# Patient Record
Sex: FEMALE | URBAN - METROPOLITAN AREA
[De-identification: names, ages, dates, MRNs, and addresses within clinical notes are randomized per-mention and may not be internally consistent; named-entity substitution may affect disease eponyms.]

---

## 2018-11-08 ENCOUNTER — APPOINTMENT (RX ONLY)
Dept: URBAN - METROPOLITAN AREA CLINIC 9 | Facility: CLINIC | Age: 73
Setting detail: DERMATOLOGY
End: 2018-11-08

## 2018-11-08 DIAGNOSIS — L30.9 DERMATITIS, UNSPECIFIED: ICD-10-CM

## 2018-11-08 PROCEDURE — ? PATIENT SPECIFIC COUNSELING

## 2018-11-08 PROCEDURE — 99202 OFFICE O/P NEW SF 15 MIN: CPT

## 2018-11-08 PROCEDURE — ? PRESCRIPTION

## 2018-11-08 PROCEDURE — ? COUNSELING

## 2018-11-08 RX ORDER — FLUOCINONIDE 0.5 MG/ML
SOLUTION TOPICAL
Qty: 1 | Refills: 0 | Status: ERX | COMMUNITY
Start: 2018-11-08

## 2018-11-08 RX ADMIN — FLUOCINONIDE: 0.5 SOLUTION TOPICAL at 00:00

## 2018-11-08 ASSESSMENT — LOCATION DETAILED DESCRIPTION DERM: LOCATION DETAILED: RIGHT SUPERIOR PARIETAL SCALP

## 2018-11-08 ASSESSMENT — LOCATION ZONE DERM: LOCATION ZONE: SCALP

## 2018-11-08 ASSESSMENT — LOCATION SIMPLE DESCRIPTION DERM: LOCATION SIMPLE: SCALP

## 2018-11-08 NOTE — PROCEDURE: COUNSELING
Patient Specific Counseling (Will Not Stick From Patient To Patient): Don’t OTC anti-histamine daily at night.
Detail Level: Zone

## 2018-11-08 NOTE — PROCEDURE: PATIENT SPECIFIC COUNSELING
Detail Level: Zone
Consider patch test in future if rash continues to recur.\\n\\nWould like to f/u with Dr. Bill for rash f/u as well as filler.

## 2019-08-28 ENCOUNTER — APPOINTMENT (RX ONLY)
Dept: URBAN - METROPOLITAN AREA CLINIC 42 | Facility: CLINIC | Age: 74
Setting detail: DERMATOLOGY
End: 2019-08-28

## 2019-08-28 DIAGNOSIS — L81.4 OTHER MELANIN HYPERPIGMENTATION: ICD-10-CM

## 2019-08-28 DIAGNOSIS — D485 NEOPLASM OF UNCERTAIN BEHAVIOR OF SKIN: ICD-10-CM

## 2019-08-28 DIAGNOSIS — L82.1 OTHER SEBORRHEIC KERATOSIS: ICD-10-CM

## 2019-08-28 DIAGNOSIS — L60.3 NAIL DYSTROPHY: ICD-10-CM

## 2019-08-28 DIAGNOSIS — L20.89 OTHER ATOPIC DERMATITIS: ICD-10-CM

## 2019-08-28 DIAGNOSIS — L71.8 OTHER ROSACEA: ICD-10-CM

## 2019-08-28 DIAGNOSIS — D18.0 HEMANGIOMA: ICD-10-CM

## 2019-08-28 DIAGNOSIS — L21.8 OTHER SEBORRHEIC DERMATITIS: ICD-10-CM

## 2019-08-28 DIAGNOSIS — L57.0 ACTINIC KERATOSIS: ICD-10-CM

## 2019-08-28 DIAGNOSIS — D22 MELANOCYTIC NEVI: ICD-10-CM

## 2019-08-28 PROBLEM — D18.01 HEMANGIOMA OF SKIN AND SUBCUTANEOUS TISSUE: Status: ACTIVE | Noted: 2019-08-28

## 2019-08-28 PROBLEM — D22.72 MELANOCYTIC NEVI OF LEFT LOWER LIMB, INCLUDING HIP: Status: ACTIVE | Noted: 2019-08-28

## 2019-08-28 PROBLEM — D22.62 MELANOCYTIC NEVI OF LEFT UPPER LIMB, INCLUDING SHOULDER: Status: ACTIVE | Noted: 2019-08-28

## 2019-08-28 PROBLEM — D22.5 MELANOCYTIC NEVI OF TRUNK: Status: ACTIVE | Noted: 2019-08-28

## 2019-08-28 PROBLEM — D22.61 MELANOCYTIC NEVI OF RIGHT UPPER LIMB, INCLUDING SHOULDER: Status: ACTIVE | Noted: 2019-08-28

## 2019-08-28 PROBLEM — L20.84 INTRINSIC (ALLERGIC) ECZEMA: Status: ACTIVE | Noted: 2019-08-28

## 2019-08-28 PROBLEM — D22.39 MELANOCYTIC NEVI OF OTHER PARTS OF FACE: Status: ACTIVE | Noted: 2019-08-28

## 2019-08-28 PROBLEM — D48.5 NEOPLASM OF UNCERTAIN BEHAVIOR OF SKIN: Status: ACTIVE | Noted: 2019-08-28

## 2019-08-28 PROBLEM — D22.71 MELANOCYTIC NEVI OF RIGHT LOWER LIMB, INCLUDING HIP: Status: ACTIVE | Noted: 2019-08-28

## 2019-08-28 PROCEDURE — ? PRESCRIPTION

## 2019-08-28 PROCEDURE — ? COUNSELING

## 2019-08-28 PROCEDURE — 99214 OFFICE O/P EST MOD 30 MIN: CPT | Mod: 25

## 2019-08-28 PROCEDURE — ? TREATMENT REGIMEN

## 2019-08-28 PROCEDURE — ? LIQUID NITROGEN

## 2019-08-28 PROCEDURE — 17003 DESTRUCT PREMALG LES 2-14: CPT

## 2019-08-28 PROCEDURE — ? ADDITIONAL NOTES

## 2019-08-28 PROCEDURE — 17000 DESTRUCT PREMALG LESION: CPT

## 2019-08-28 RX ORDER — KETOCONAZOLE 20 MG/ML
1ML SHAMPOO TOPICAL TIW
Qty: 1 | Refills: 2 | Status: ERX | COMMUNITY
Start: 2019-08-28

## 2019-08-28 RX ORDER — METRONIDAZOLE 7.5 MG/G
CREAM TOPICAL BID
Qty: 1 | Refills: 1 | Status: ERX | COMMUNITY
Start: 2019-08-28

## 2019-08-28 RX ORDER — BETAMETHASONE DIPROPIONATE 0.64 MG/ML
1ML LOTION TOPICAL BID
Qty: 1 | Refills: 1 | Status: ERX | COMMUNITY
Start: 2019-08-28

## 2019-08-28 RX ORDER — TRIAMCINOLONE ACETONIDE 1 MG/G
1GM CREAM TOPICAL BID
Qty: 1 | Refills: 1 | Status: ERX | COMMUNITY
Start: 2019-08-28

## 2019-08-28 RX ADMIN — METRONIDAZOLE: 7.5 CREAM TOPICAL at 00:00

## 2019-08-28 RX ADMIN — BETAMETHASONE DIPROPIONATE 1ML: 0.64 LOTION TOPICAL at 00:00

## 2019-08-28 RX ADMIN — TRIAMCINOLONE ACETONIDE 1GM: 1 CREAM TOPICAL at 00:00

## 2019-08-28 RX ADMIN — KETOCONAZOLE 1ML: 20 SHAMPOO TOPICAL at 00:00

## 2019-08-28 ASSESSMENT — LOCATION SIMPLE DESCRIPTION DERM
LOCATION SIMPLE: LEFT CHEEK
LOCATION SIMPLE: LEFT THIGH
LOCATION SIMPLE: RIGHT FOREHEAD
LOCATION SIMPLE: UPPER BACK
LOCATION SIMPLE: LEFT FOREHEAD
LOCATION SIMPLE: LEFT PRETIBIAL REGION
LOCATION SIMPLE: LEFT POSTERIOR UPPER ARM
LOCATION SIMPLE: CHEST
LOCATION SIMPLE: LEFT UPPER BACK
LOCATION SIMPLE: RIGHT CHEEK
LOCATION SIMPLE: ABDOMEN
LOCATION SIMPLE: RIGHT THIGH
LOCATION SIMPLE: RIGHT INDEX FINGER
LOCATION SIMPLE: POSTERIOR SCALP
LOCATION SIMPLE: RIGHT GREAT TOE
LOCATION SIMPLE: RIGHT POSTERIOR UPPER ARM
LOCATION SIMPLE: RIGHT INFERIOR EYELID

## 2019-08-28 ASSESSMENT — LOCATION DETAILED DESCRIPTION DERM
LOCATION DETAILED: LEFT DISTAL POSTERIOR UPPER ARM
LOCATION DETAILED: RIGHT LATERAL INFERIOR EYELID
LOCATION DETAILED: LEFT SUPERIOR MEDIAL UPPER BACK
LOCATION DETAILED: RIGHT INFERIOR CENTRAL MALAR CHEEK
LOCATION DETAILED: LEFT INFERIOR MEDIAL FOREHEAD
LOCATION DETAILED: UPPER STERNUM
LOCATION DETAILED: LEFT INFERIOR MEDIAL UPPER BACK
LOCATION DETAILED: LEFT PROXIMAL PRETIBIAL REGION
LOCATION DETAILED: POSTERIOR MID-PARIETAL SCALP
LOCATION DETAILED: LEFT INFERIOR CENTRAL MALAR CHEEK
LOCATION DETAILED: EPIGASTRIC SKIN
LOCATION DETAILED: RIGHT DISTAL POSTERIOR UPPER ARM
LOCATION DETAILED: SUPERIOR THORACIC SPINE
LOCATION DETAILED: LEFT MEDIAL UPPER BACK
LOCATION DETAILED: RIGHT GREAT TOENAIL
LOCATION DETAILED: RIGHT ANTERIOR DISTAL THIGH
LOCATION DETAILED: RIGHT INFERIOR MEDIAL FOREHEAD
LOCATION DETAILED: LEFT ANTERIOR DISTAL THIGH
LOCATION DETAILED: RIGHT PROXIMAL DORSAL INDEX FINGER

## 2019-08-28 ASSESSMENT — LOCATION ZONE DERM
LOCATION ZONE: EYELID
LOCATION ZONE: FACE
LOCATION ZONE: ARM
LOCATION ZONE: LEG
LOCATION ZONE: SCALP
LOCATION ZONE: TRUNK
LOCATION ZONE: FINGER
LOCATION ZONE: TOENAIL

## 2019-08-28 NOTE — HPI: EVALUATION OF SKIN LESION(S)
What Type Of Note Output Would You Prefer (Optional)?: Standard Output
Hpi Title: Evaluation of Skin Lesions
How Severe Are Your Spot(S)?: moderate
Have Your Spot(S) Been Treated In The Past?: has been treated
Family Member: Father
Additional History: Back , cheeks, underarms

## 2019-08-28 NOTE — PROCEDURE: ADDITIONAL NOTES
Detail Level: Simple
Additional Notes: right lateral cheek\\nright index finger\\nleft tibia\\nln2 x 3 sites\\nRTC  2 months to recheck if no improvement plan for Bx on R cheek
Additional Notes: Stressed removal with ophthalmologist to send for pathology\\nright lower eyelid margin\\nhx of removal, but never bx\\nrecommend bx with opthlam to r/o tag/bcc

## 2019-08-28 NOTE — PROCEDURE: LIQUID NITROGEN
Render Post-Care Instructions In Note?: no
Detail Level: Detailed
Post-Care Instructions: I reviewed with the patient in detail post-care instructions. Patient is to wear sunprotection, and avoid picking at any of the treated lesions. Pt may apply Vaseline to crusted or scabbing areas.
Number Of Freeze-Thaw Cycles: 2 freeze-thaw cycles
Duration Of Freeze Thaw-Cycle (Seconds): 0
Consent: The patient's consent was obtained including but not limited to risks of crusting, scabbing, blistering, scarring, darker or lighter pigmentary change, recurrence, incomplete removal and infection.

## 2019-08-28 NOTE — PROCEDURE: COUNSELING
Detail Level: Generalized
Detail Level: Zone
Patient Specific Counseling (Will Not Stick From Patient To Patient): right great toenail\\nhemorrhaged/post trauma/kicking ball 3 months ago\\nnormal proximal nail growth\\nctn to mtn \\nf/u 3-4 months\\n
Detail Level: Detailed

## 2019-10-23 ENCOUNTER — APPOINTMENT (RX ONLY)
Dept: URBAN - METROPOLITAN AREA CLINIC 42 | Facility: CLINIC | Age: 74
Setting detail: DERMATOLOGY
End: 2019-10-23

## 2019-10-23 DIAGNOSIS — L82.1 OTHER SEBORRHEIC KERATOSIS: ICD-10-CM

## 2019-10-23 DIAGNOSIS — L57.0 ACTINIC KERATOSIS: ICD-10-CM | Status: RESOLVED

## 2019-10-23 PROCEDURE — ? ADDITIONAL NOTES

## 2019-10-23 PROCEDURE — ? COUNSELING

## 2019-10-23 PROCEDURE — ? DEFER

## 2019-10-23 PROCEDURE — 99214 OFFICE O/P EST MOD 30 MIN: CPT

## 2019-10-23 ASSESSMENT — LOCATION SIMPLE DESCRIPTION DERM
LOCATION SIMPLE: RIGHT LOWER BACK
LOCATION SIMPLE: RIGHT CHEEK
LOCATION SIMPLE: LEFT PRETIBIAL REGION
LOCATION SIMPLE: ABDOMEN
LOCATION SIMPLE: LEFT LOWER BACK
LOCATION SIMPLE: RIGHT INDEX FINGER

## 2019-10-23 ASSESSMENT — LOCATION DETAILED DESCRIPTION DERM
LOCATION DETAILED: RIGHT LATERAL ABDOMEN
LOCATION DETAILED: LEFT PROXIMAL PRETIBIAL REGION
LOCATION DETAILED: RIGHT PROXIMAL DORSAL INDEX FINGER
LOCATION DETAILED: RIGHT INFERIOR CENTRAL MALAR CHEEK
LOCATION DETAILED: LEFT SUPERIOR LATERAL LOWER BACK
LOCATION DETAILED: LEFT LATERAL ABDOMEN
LOCATION DETAILED: LEFT INFERIOR LATERAL MIDBACK
LOCATION DETAILED: RIGHT SUPERIOR LATERAL LOWER BACK
LOCATION DETAILED: RIGHT INFERIOR LATERAL MIDBACK

## 2019-10-23 ASSESSMENT — LOCATION ZONE DERM
LOCATION ZONE: FINGER
LOCATION ZONE: LEG
LOCATION ZONE: TRUNK
LOCATION ZONE: FACE

## 2019-10-23 NOTE — PROCEDURE: MIPS QUALITY
Detail Level: Detailed
Quality 110: Preventive Care And Screening: Influenza Immunization: Influenza Immunization previously received during influenza season
Quality 130: Documentation Of Current Medications In The Medical Record: Current Medications Documented
Quality 111:Pneumonia Vaccination Status For Older Adults: Pneumococcal Vaccination not Administered or Previously Received, Reason not Otherwise Specified
Quality 131: Pain Assessment And Follow-Up: Pain assessment using a standardized tool is documented as negative, no follow-up plan required

## 2019-10-23 NOTE — PROCEDURE: ADDITIONAL NOTES
Detail Level: Simple
Additional Notes: right cheek, right finger, left leg resolved\\nctn to mtn for recurrence\\n

## 2019-10-23 NOTE — PROCEDURE: DEFER
Detail Level: Detailed
Introduction Text (Please End With A Colon): The following procedure was deferred:
Scheduling Instructions (Optional): up to 15 for $150

## 2023-05-25 ENCOUNTER — APPOINTMENT (RX ONLY)
Dept: URBAN - METROPOLITAN AREA CLINIC 42 | Facility: CLINIC | Age: 78
Setting detail: DERMATOLOGY
End: 2023-05-25

## 2023-05-25 DIAGNOSIS — Z41.9 ENCOUNTER FOR PROCEDURE FOR PURPOSES OTHER THAN REMEDYING HEALTH STATE, UNSPECIFIED: ICD-10-CM

## 2023-05-25 PROCEDURE — ? OTHER

## 2023-05-25 PROCEDURE — ? BOTOX

## 2023-05-25 NOTE — PROCEDURE: BOTOX
Left Periorbital Units: 0
Detail Level: Detailed
Show Nasal Units: Yes
Dilution (U/0.1 Cc): 4
Incrementing Botox Units: By 0.5 Units
Show Ucl Units: No
Comments: $13/unit
Expiration Date (Month Year): 04/2025
Price (Use Numbers Only, No Special Characters Or $): 871
Consent: Written consent obtained. Risks include but not limited to lid/brow ptosis, bruising, swelling, diplopia, temporary effect, incomplete chemical denervation.
Post-Care Instructions: Patient instructed to not lie down for 4 hours and limit physical activity for 24 hours.
Periorbital Skin Units: 20
Lot #: [id number]

## 2024-08-14 ENCOUNTER — RX ONLY (OUTPATIENT)
Age: 79
Setting detail: RX ONLY
End: 2024-08-14

## 2024-08-14 ENCOUNTER — APPOINTMENT (RX ONLY)
Dept: URBAN - METROPOLITAN AREA CLINIC 43 | Facility: CLINIC | Age: 79
Setting detail: DERMATOLOGY
End: 2024-08-14

## 2024-08-14 DIAGNOSIS — Z41.9 ENCOUNTER FOR PROCEDURE FOR PURPOSES OTHER THAN REMEDYING HEALTH STATE, UNSPECIFIED: ICD-10-CM

## 2024-08-14 PROCEDURE — ? BOTOX

## 2024-08-14 PROCEDURE — ? FILLERS

## 2024-08-14 RX ORDER — TRETIONIN 0.25 MG/G
PEA-SIZED AMOUNT CREAM TOPICAL ONCE A DAY
Qty: 45 | Refills: 0 | Status: ERX | COMMUNITY
Start: 2024-08-14

## 2024-08-14 NOTE — PROCEDURE: BOTOX
R Brow Units: 0
Show Masseter Units: Yes
Show Right And Left Pupillary Line Units: No
Post-Care Instructions: Patient instructed to not lie down for 4 hours and limit physical activity for 24 hours.
Comments: Presents for Botox in her orbicularis oculi. Her concern is reducing squinting. Also requested lateral brow lift. On exam, strong mentalis and platysmal bands. Recommended treating platysmal bands today and mentalis in 2 weeks at follow up. Patient in agreement with plan. \\n\\nPMHx, allergies, medications reviewed.\\nPhotos and consents obtained. \\nSkin cleansed with alcohol and marked.\\n\\nTolerated well. Arnica applied. Aftercare instructions reviewed. Patient to follow up in the office in 2 weeks.\\n\\nDiscussed skincare. Patient does not have a current skincare routine, but is using SPF. Recommended vitamin c serum and tretinoin. Discussed application instructions and expected side effects. Will send rx to patient’s preferred pharmacy.
Additional Area 1 Location: orbicularis oculi (lateral brow lift)
Periorbital Skin Units: 18
Incrementing Botox Units: By 0.5 Units
Expiration Date (Month Year): 06/2026
Anterior Platysmal Bands Units: 20
Additional Area 1 Units: 4
Lateral Platysmal Bands Units: 12
Price (Use Numbers Only, No Special Characters Or $): 646
Lot #: [id number]
Consent: Written consent obtained. Risks include but not limited to lid/brow ptosis, bruising, swelling, diplopia, temporary effect, incomplete chemical denervation.
Dilution (U/0.1 Cc): 10
Detail Level: Detailed

## 2024-08-14 NOTE — PROCEDURE: FILLERS
Nasolabial Folds Filler Volume In Cc: 0
Post-Care Instructions: After the procedure, patient instructed to apply ice to reduce swelling.
Expiration Date (Month Year): 02/28/2025
Include Cannula Size?: 25G
Additional Area 1 Volume In Cc: 0.4
Lot #: [id number]
Additional Area 2 Location: Right pre-jowl sulcus
Include Cannula Information In Note?: No
Include Cannula Length?: 1.5 inch
Additional Area 2 Volume In Cc: 0.2
Filler Comments: Presents for cheek filler and chin filler. She has had cheek  the past. Recommended 1 syringe per cheek but patient does not have the budget today for 3 syringes of filler. Discussed building on product in the future if she would like more volume.\\n\\nPMHx, allergies, medications reviewed.\\nPhotos and consents obtained.\\nPatient cleansed face in treatment room with facial cleanser. NP cleansed skin with alcohol and puracyn.\\n\\nRight cheek 0.4 ml\\nLeft cheek 0.8 ml\\n\\nTolerated well. Mild massage with arnica gel performed. Small bruise observed on right cheek. Capillary refill <2 seconds. No signs of vascular occlusion. Aftercare instructions reviewed including when to follow up in the office immediately with NP.
Detail Level: Detailed
Expiration Date (Month Year): 08/31/2024
Filler: Restylane Defyne
Additional Area 3 Location: Pyriform (right & left)
Lot #: [id number]
Anesthesia Type: 1% lidocaine with epinephrine
Additional Area 1 Location: Chin Shadows
Expiration Date (Month Year): 07/02/2024
Include Cannula Information In Note?: Yes
Price (Use Numbers Only, No Special Characters Or $): 1500
Filler Comments: Tolerated well. Mild massage with arnica gel performed. No bruising observed. Capillary refill <2 seconds. No signs of vascular occlusion. Aftercare instructions reviewed including when to follow up in the office immediately with NP.
Lot #: [id number]
Lot #: [id number]
Expiration Date (Month Year): 08/25/2025
Additional Area 1 Location: Pre-jowl sulcus (left)
Topical Anesthesia?: 23% lidocaine, 7% tetracaine
Expiration Date (Month Year): 09/30/2025
Map Statment: See Attach Map for Complete Details
Additional Area 2 Location: Right medial cheek
Filler: RHA 4
Aspiration Statement: Aspiration was performed prior to injecting site with filler.
Additional Area 3 Location: Right deep medial fat compartment
Lot #: [id number]
Cheeks Filler Volume In Cc: 1.2
Additional Area 4 Location: Left deep medial fat compartment
Additional Area 1 Location: Chin
Consent: Written consent obtained. Risks include but not limited to bruising, beading, irregular texture, ulceration, infection, allergic reaction, scar formation, incomplete augmentation, temporary nature, and procedural pain.

## 2024-08-27 ENCOUNTER — APPOINTMENT (RX ONLY)
Dept: URBAN - METROPOLITAN AREA CLINIC 42 | Facility: CLINIC | Age: 79
Setting detail: DERMATOLOGY
End: 2024-08-27

## 2024-08-27 DIAGNOSIS — Z41.9 ENCOUNTER FOR PROCEDURE FOR PURPOSES OTHER THAN REMEDYING HEALTH STATE, UNSPECIFIED: ICD-10-CM

## 2024-08-27 PROCEDURE — ? COSMETIC FOLLOW-UP

## 2024-08-27 NOTE — PROCEDURE: COSMETIC FOLLOW-UP
Comments (Free Text): Presents for Botox follow up. She reports being pleased with her result. On animation of lateral canthus, decreased muscle activity since baseline. Platysmal bands have improved significantly and only the two anterior bands remain visible on animation. Patient does not want additional Botox today. Photos obtained. Reviewed before/after photos with patient. Suggested treating glabella with Botox at her next visit to also help with patient’s squinting that bothers her.\\n\\nReports being very pleased with the result of her filler. She is interested in additional chin filler. Discussed more projection and treatment of chin shadows with bow tie method. She is also interested in some lip filler. She would like to do be treated later in the Fall when she is due for her next Botox. Follow up PRN.
Price (Use Numbers Only, No Special Characters Or $): 0
Detail Level: Zone

## 2025-05-30 NOTE — PROCEDURE: TREATMENT REGIMEN
Check vitamin D.  Orders:    cyclobenzaprine 10 MG Oral Tab; Take 1 tablet (10 mg total) by mouth nightly.    Prevnar 20 (PCV20) [98577]    Zoster Recombinant Adjuvanted (Shingrix -Shingles) [10971]    CBC With Differential With Platelet; Future    Comp Metabolic Panel (14); Future    Lipid Panel; Future    TSH W Reflex To Free T4; Future    Hemoglobin A1C; Future    Vitamin D; Future    Ferritin [E]; Future    Iron And Tibc [E]; Future    Vitamin B12 [E]; Future    Folic Acid Serum [E]; Future    XR DEXA BONE DENSITOMETRY (CPT=98256); Future    
Continue medication as above.  Recheck kidney function electrolytes demonstrate stability.  Orders:    cyclobenzaprine 10 MG Oral Tab; Take 1 tablet (10 mg total) by mouth nightly.    Prevnar 20 (PCV20) [53509]    Zoster Recombinant Adjuvanted (Shingrix -Shingles) [18745]    CBC With Differential With Platelet; Future    Comp Metabolic Panel (14); Future    Lipid Panel; Future    TSH W Reflex To Free T4; Future    Hemoglobin A1C; Future    Vitamin D; Future    Ferritin [E]; Future    Iron And Tibc [E]; Future    Vitamin B12 [E]; Future    Folic Acid Serum [E]; Future    XR DEXA BONE DENSITOMETRY (CPT=52629); Future    
Continue pantoprazole as needed.  Orders:    cyclobenzaprine 10 MG Oral Tab; Take 1 tablet (10 mg total) by mouth nightly.    Prevnar 20 (PCV20) [44401]    Zoster Recombinant Adjuvanted (Shingrix -Shingles) [15581]    CBC With Differential With Platelet; Future    Comp Metabolic Panel (14); Future    Lipid Panel; Future    TSH W Reflex To Free T4; Future    Hemoglobin A1C; Future    Vitamin D; Future    Ferritin [E]; Future    Iron And Tibc [E]; Future    Vitamin B12 [E]; Future    Folic Acid Serum [E]; Future    XR DEXA BONE DENSITOMETRY (CPT=52528); Future    
Continue zolpidem as needed.  Orders:    cyclobenzaprine 10 MG Oral Tab; Take 1 tablet (10 mg total) by mouth nightly.    Prevnar 20 (PCV20) [53723]    Zoster Recombinant Adjuvanted (Shingrix -Shingles) [11530]    CBC With Differential With Platelet; Future    Comp Metabolic Panel (14); Future    Lipid Panel; Future    TSH W Reflex To Free T4; Future    Hemoglobin A1C; Future    Vitamin D; Future    Ferritin [E]; Future    Iron And Tibc [E]; Future    Vitamin B12 [E]; Future    Folic Acid Serum [E]; Future    XR DEXA BONE DENSITOMETRY (CPT=75304); Future    
Following with psychiatry.  She continues on venlafaxine and lamotrigine.  Orders:    cyclobenzaprine 10 MG Oral Tab; Take 1 tablet (10 mg total) by mouth nightly.    Prevnar 20 (PCV20) [38967]    Zoster Recombinant Adjuvanted (Shingrix -Shingles) [62169]    CBC With Differential With Platelet; Future    Comp Metabolic Panel (14); Future    Lipid Panel; Future    TSH W Reflex To Free T4; Future    Hemoglobin A1C; Future    Vitamin D; Future    Ferritin [E]; Future    Iron And Tibc [E]; Future    Vitamin B12 [E]; Future    Folic Acid Serum [E]; Future    XR DEXA BONE DENSITOMETRY (CPT=41258); Future    
No need for cervical cancer screening as patient is status post hysterectomy.       
Recheck cholesterol panel.  Orders:    cyclobenzaprine 10 MG Oral Tab; Take 1 tablet (10 mg total) by mouth nightly.    Prevnar 20 (PCV20) [81659]    Zoster Recombinant Adjuvanted (Shingrix -Shingles) [11938]    CBC With Differential With Platelet; Future    Comp Metabolic Panel (14); Future    Lipid Panel; Future    TSH W Reflex To Free T4; Future    Hemoglobin A1C; Future    Vitamin D; Future    Ferritin [E]; Future    Iron And Tibc [E]; Future    Vitamin B12 [E]; Future    Folic Acid Serum [E]; Future    XR DEXA BONE DENSITOMETRY (CPT=58410); Future    
Use of albuterol as needed.  If she finds that she is reaching for the albuterol more than 2 or 3 times a week, then she should restart the Breo, and then follow-up for further evaluation.  Orders:    cyclobenzaprine 10 MG Oral Tab; Take 1 tablet (10 mg total) by mouth nightly.    Prevnar 20 (PCV20) [91244]    Zoster Recombinant Adjuvanted (Shingrix -Shingles) [31825]    CBC With Differential With Platelet; Future    Comp Metabolic Panel (14); Future    Lipid Panel; Future    TSH W Reflex To Free T4; Future    Hemoglobin A1C; Future    Vitamin D; Future    Ferritin [E]; Future    Iron And Tibc [E]; Future    Vitamin B12 [E]; Future    Folic Acid Serum [E]; Future    XR DEXA BONE DENSITOMETRY (CPT=06437); Future    
Detail Level: Zone
Initiate Treatment: Diprolene lotion scalp BID x 2 weeks then prn , nizoral shampoo 2-3 times weekly
Initiate Treatment: Trunk TAC 0.1% cream BID x 2 weeks then as needed for flares

## 2025-08-27 ENCOUNTER — APPOINTMENT (OUTPATIENT)
Dept: URBAN - METROPOLITAN AREA CLINIC 43 | Facility: CLINIC | Age: 80
Setting detail: DERMATOLOGY
End: 2025-08-27

## 2025-08-27 DIAGNOSIS — Z41.9 ENCOUNTER FOR PROCEDURE FOR PURPOSES OTHER THAN REMEDYING HEALTH STATE, UNSPECIFIED: ICD-10-CM

## 2025-08-27 PROCEDURE — ? BOTOX
